# Patient Record
(demographics unavailable — no encounter records)

---

## 2017-04-18 NOTE — ER DOCUMENT REPORT
ED GI/





- General


Chief Complaint: Urinary Incontinence


Stated Complaint: DISORIENTED,NO CONTROL OF URINE


Mode of Arrival: Wheelchair


Information source: Patient


TRAVEL OUTSIDE OF THE [place] IN LAST 30 DAYS: No





- HPI


Patient complains to provider of: Other - CONFUSED, URINARY URGENCY W/ 

INCONTINENCE


Onset: Yesterday


Timing/Duration: Gradual, Waxing and waning


Quality of pain: No pain


Severity at maximum: Moderate


Severity in ED: Mild


Context: denies: Bad food, Lifting, Out of the country travel, Pregnant, Recent 

trauma


Vaginal bleeding (Compared to normal period): None


Menstrual period history: S/P menopausal


Associated symptoms: Urinary urgency.  denies: Chills, Fever, Hematuria, Nausea

, Sweaty


Exacerbated by: Denies


Relieved by: Denies


Similar symptoms previously: Yes - W/ UTI


Recently seen / treated by doctor: Yes - Rx FOR BRONCHITIS (AMOXIL)





- Related Data


Allergies/Adverse Reactions: 


 





doxepin Allergy (Verified 17 12:49)


 








Home Medications: 


 Current Home Medications





Buspirone HCl [Buspar 10 mg Tablet] 10 mg PO BID 17 [History]


Hydromorphone HCl 2 mg PO BID 17 [History]


Temazepam 30 mg PO DAILY 17 [History]


Temazepam [Restoril 15 mg Capsule] 30 mg PO QHS 17 [History]











Past Medical History





- General


Information source: Patient, Relative





- Social History


Smoking Status: Never Smoker


Cigarette use (# per day): No


Chew tobacco use (# tins/day): No


Smoking Education Provided: No


Frequency of alcohol use: None


Drug Abuse: None


Lives with: Family


Family History: Reviewed & Not Pertinent


Patient has suicidal ideation: No


Patient has homicidal ideation: No





- Past Medical History


Cardiac Medical History: Reports: Hx Hypercholesterolemia - no meds x 2 years, 

after 60 pound wt loss, Hx Hypertension - no meds x 2 years, after 60 pound wt 

loss


   Denies: Hx Atrial Fibrillation, Hx Congestive Heart Failure, Hx Coronary 

Artery Disease, Hx Heart Attack, Hx Peripheral Vascular Disease, Hx Pulmonary 

Embolism, Hx Heart Murmur


Pulmonary Medical History: Reports: Hx Pneumonia - denies hospitalization, Hx 

Sleep Apnea - CPAP ill fitting, does not wear


   Denies: Hx Asthma, Hx Bronchitis, Hx COPD, Hx Respiratory Failure, Hx 

Tuberculosis


Neurological Medical History: Reports: Hx Cerebrovascular Accident - , 

, Hx Seizures - possibly r/t severe "migraine type" HA's


Endocrine Medical History: Reports: Hx Diabetes Mellitus Type 2, Hx 

Hypothyroidism - meds Rx'ed .  Denies: Hx Graves' Disease, Hx 

Hyperthyroidism


Renal/ Medical History: Denies: Hx End Stage Renal Disease, Hx Kidney Stones, 

Hx Ovarian Cysts, Hx Peritoneal Dialysis, Hx Pelvic Inflammatory Disease


Malignancy Medical History: Denies: Hx Breast Cancer, Hx Cervical Cancer, Hx 

Leukemia, Hx Lung Cancer, Hx Ovarian Cancer


GI Medical History: Reports: Hx Gastroesophageal Reflux Disease - meds x 20+ 

years, Hx Ulcer - .  Denies: Hx Crohn's Disease, Hx Hepatitis, Hx Hiatal 

Hernia, Hx Irritable Bowel, Hx Liver Failure


Musculoskeltal Medical History: Reports Hx Arthritis, Denies Hx Fibromyalgia, 

Denies Hx Multiple Sclerosis, Denies Hx Muscular Dystrophy


Psychiatric Medical History: Reports: Hx Dementia, Hx Depression


   Denies: Hx Bipolar Disorder, Hx Post Traumatic Stress Disorder, Hx 

Schizophrenia


Traumatic Medical History: Reports: Hx Fractures - 5th toe RT foot


Infectious Medical History: Denies: Hx Hepatitis, Hx HIV


Past Surgical History: Reports: Hx Appendectomy - incidental with POP, Hx 

Cholecystectomy - open chang , Hx Hysterectomy - POP BSO .  Denies: 

Hx Bowel Surgery, Hx  Section, Hx Colostomy, Hx Coronary Artery Bypass 

Graft, Hx Gastric Bypass Surgery, Hx Herniorrhaphy, Hx Mastectomy, Hx Open 

Heart Surgery, Hx Pacemaker, Hx Tonsillectomy, Hx Tubal Ligation





- Immunizations


Immunizations up to date: Yes


Hx Diphtheria, Pertussis, Tetanus Vaccination: Yes


Hx Pneumococcal Vaccination: 10/01/11





Review of Systems





- Review of Systems


Constitutional: No symptoms reported.  denies: Chills, Diaphoresis, Fever


EENT: No symptoms reported


Cardiovascular: No symptoms reported


Respiratory: Cough


Gastrointestinal: No symptoms reported.  denies: Diarrhea, Nausea, Vomiting


Genitourinary: See HPI


Female Genitourinary: Post menopausal


Musculoskeletal: No symptoms reported


Skin: No symptoms reported


Neurological/Psychological: No symptoms reported





Physical Exam





- Vital signs


Vitals: 


 











Temp Pulse Resp BP Pulse Ox


 


 97.4 F   77   18   184/104 H  94 


 


 17 12:33  17 12:33  17 12:33  17 12:33  17 12:33











Interpretation: Hypertensive.  No: Tachycardic, Hypoxic, Tachypneic, Febrile





- General


General appearance: Appears well, Alert


In distress: None





- HEENT


Head: Normocephalic


Eyes: Normal


Conjunctiva: Normal


Ears: Normal


Nasal: Normal


Mucous membranes: Dry, Other - WHITISH PLAQUES ON PALATE, ? JOSE





- Respiratory


Respiratory status: No respiratory distress


Chest status: Nontender


Breath sounds: Nonproductive cough - FREQUENT





- Cardiovascular


Rhythm: Regular


Heart sounds: Normal auscultation


Murmur: No





- Abdominal


Inspection: Normal


Distension: No distension


Bowel sounds: Normal


Tenderness: Tender - SUPRAPUBIC





- Back


Back: Normal





- Extremities


General upper extremity: Normal inspection


General lower extremity: Normal inspection.  No: Tender, Edema





- Neurological


Neuro grossly intact: Yes


Cognition: Normal


Orientation: AAOx4





- Psychological


Associated symptoms: Normal affect, Normal mood





- Skin


Skin Temperature: Warm


Skin Moisture: Dry


Skin Color: Normal


Skin Turgor: Elastic





Course





- Re-evaluation


Re-evalutation: 





17 19:13


Patient states she feels much better.  Denies thirst.  Blood sugar is coming 

down satisfactorily.  Hypertension persists and will need treatment.  Diagnoses 

and treatment plan discussed with patient and family.  She will be started on 

glyburide and a low dose of lisinopril and should be followed up by her PCP in 

3 days.





- Vital Signs


Vital signs: 


 











Temp Pulse Resp BP Pulse Ox


 


 97.4 F   77   18   168/90 H  94 


 


 17 12:33  17 12:33  17 18:01  17 18:01  17 18:01














- Laboratory


Result Diagrams: 


 17 13:00





 17 13:00


Laboratory results interpreted by me: 


 











  17





  13:00 13:00 13:05


 


WBC  19.4 H  


 


Hgb  16.2 H  


 


Hct  47.6 H  


 


Seg Neutrophils %  91.7 H  


 


Lymphocytes %  5.5 L  


 


Monocytes %  2.5 L  


 


Absolute Neutrophils  17.7 H  


 


Sodium   129.2 L 


 


Chloride   92 L 


 


Carbon Dioxide   21 L 


 


BUN   33 H 


 


Glucose   672 H* 


 


POC Glucose   


 


Alkaline Phosphatase   133 H 


 


Urine Glucose (UA)    >=500 H


 


Urine Blood    SMALL H














  17





  14:38 15:28 16:50


 


WBC   


 


Hgb   


 


Hct   


 


Seg Neutrophils %   


 


Lymphocytes %   


 


Monocytes %   


 


Absolute Neutrophils   


 


Sodium   


 


Chloride   


 


Carbon Dioxide   


 


BUN   


 


Glucose   


 


POC Glucose  476 H*  489 H*  340 H


 


Alkaline Phosphatase   


 


Urine Glucose (UA)   


 


Urine Blood   














  17





  17:46


 


WBC 


 


Hgb 


 


Hct 


 


Seg Neutrophils % 


 


Lymphocytes % 


 


Monocytes % 


 


Absolute Neutrophils 


 


Sodium 


 


Chloride 


 


Carbon Dioxide 


 


BUN 


 


Glucose 


 


POC Glucose  326 H


 


Alkaline Phosphatase 


 


Urine Glucose (UA) 


 


Urine Blood 














- Diagnostic Test


Radiology reviewed: Image reviewed, Reports reviewed





Discharge





- Discharge


Clinical Impression: 


 Dehydration, Hyperglycemia without ketosis, Essential hypertension





Condition: Stable


Disposition: HOME, SELF-CARE


Instructions:  Dehydration (OM), Diabetes (OM), High Blood Pressure, 

Requiring Treatment (Formerly [facility])


Additional Instructions: 


REST, DRINK PLENTY OF FLUIDS.


AVOID SUGAR IN YOUR DIET.


TAKE YOUR MEDS AS PRESCRIBED. 


FOLLOW UP WITH YOUR PRIMARY CARE PROVIDER WITHIN THE NEXT 3 DAYS FOR RE-CHECK 

OF YOUR BLOOD PRESSURE AND BLOOD SUGAR.


RETURN TO E.R. IF YOU GET WORSE, ANY TIME.


Prescriptions: 


Glyburide 5 mg PO QAM #7 tablet


Lisinopril 5 mg PO DAILY #7 tablet


Referrals: 


STACY MCGILL MD [Primary Care Provider] - Follow up in 3-5 days

## 2017-04-18 NOTE — ER DOCUMENT REPORT
ED Medical Screen (RME)





- General


Chief Complaint: Urinary Incontinence


Stated Complaint: DISORIENTED,NO CONTROL OF URINE


Time seen by provider: 12:49


Mode of Arrival: Wheelchair


Information source: Patient, Relative


TRAVEL OUTSIDE OF THE [place] IN LAST 30 DAYS: No





- HPI


Patient complains to provider of: urinary frequency, dysuria, forgetful


Onset: Other - 4 days


Onset/Duration: Gradual, Persistent


Quality of pain: Pressure


Severity: Moderate


Associated Symptoms: Dysuria.  denies: Diarrhea, Fever, Nausea, Vomiting


Exacerbated by: Denies


Relieved by: Denies


Similar symptoms previously: Yes


Recently seen / treated by doctor: Yes - Amoxicillin for bronchitis





- Related Data


Allergies/Adverse Reactions: 


 





No Known Allergies Allergy (Verified 17 12:33)


 











Past Medical History





- Past Medical History


Cardiac Medical History: Reports: Hx Hypercholesterolemia - no meds x 2 years, 

after 60 pound wt loss, Hx Hypertension - no meds x 2 years, after 60 pound wt 

loss


   Denies: Hx Atrial Fibrillation, Hx Congestive Heart Failure, Hx Coronary 

Artery Disease, Hx Heart Attack, Hx Peripheral Vascular Disease, Hx Pulmonary 

Embolism, Hx Heart Murmur


Pulmonary Medical History: Reports: Hx Pneumonia - denies hospitalization, Hx 

Sleep Apnea - CPAP ill fitting, does not wear


   Denies: Hx Asthma, Hx Bronchitis, Hx COPD, Hx Respiratory Failure, Hx 

Tuberculosis


Neurological Medical History: Reports: Hx Cerebrovascular Accident - 2012

, Hx Seizures - possibly r/t severe "migraine type" HA's


Endocrine Medical History: Reports: Hx Diabetes Mellitus Type 2, Hx 

Hypothyroidism - meds Rx'ed .  Denies: Hx Graves' Disease, Hx 

Hyperthyroidism


Renal/ Medical History: Denies: Hx End Stage Renal Disease, Hx Kidney Stones, 

Hx Ovarian Cysts, Hx Peritoneal Dialysis, Hx Pelvic Inflammatory Disease


Malignancy Medical History: Denies: Hx Breast Cancer, Hx Cervical Cancer, Hx 

Leukemia, Hx Lung Cancer, Hx Ovarian Cancer


GI Medical History: Reports: Hx Gastroesophageal Reflux Disease - meds x 20+ 

years, Hx Ulcer - .  Denies: Hx Crohn's Disease, Hx Hepatitis, Hx Hiatal 

Hernia, Hx Irritable Bowel, Hx Liver Failure


Musculoskeltal Medical History: Reports Hx Arthritis, Denies Hx Fibromyalgia, 

Denies Hx Multiple Sclerosis, Denies Hx Muscular Dystrophy


Psychiatric Medical History: Reports: Hx Dementia, Hx Depression


   Denies: Hx Bipolar Disorder, Hx Post Traumatic Stress Disorder, Hx 

Schizophrenia


Traumatic Medical History: Reports: Hx Fractures - 5th toe RT foot


Infectious Medical History: Denies: Hx Hepatitis, Hx HIV


Past Surgical History: Reports: Hx Appendectomy - incidental with POP, Hx 

Cholecystectomy - open chang , Hx Hysterectomy - POP BSO .  Denies: 

Hx Bowel Surgery, Hx  Section, Hx Colostomy, Hx Coronary Artery Bypass 

Graft, Hx Gastric Bypass Surgery, Hx Herniorrhaphy, Hx Mastectomy, Hx Open 

Heart Surgery, Hx Pacemaker, Hx Tonsillectomy, Hx Tubal Ligation





- Immunizations


Immunizations up to date: Yes


Hx Diphtheria, Pertussis, Tetanus Vaccination: Yes





Physical Exam





- Vital signs


Vitals: 





 











Temp Pulse Resp BP Pulse Ox


 


 97.4 F   77   18   184/104 H  94 


 


 17 12:33  17 12:33  17 12:33  17 12:33  17 12:33














Course





- Vital Signs


Vital signs: 





 











Temp Pulse Resp BP Pulse Ox


 


 97.4 F   77   18   184/104 H  94 


 


 17 12:33  17 12:33  17 12:33  17 12:33  17 12:33

## 2018-04-09 NOTE — ER DOCUMENT REPORT
ED General





- General


Chief Complaint: General Weakness


Stated Complaint: weakness


Time Seen by Provider: 18 17:01


Notes: 





Patient is a 78-year-old female with a past medical history of TIA and stroke 

chronically anticoagulated on Aggrenox as a result of this prior history, 

hypertension, hyperlipidemia, and dementia who presents with 3 days of nausea, 

vomiting, dark stool, and generalized fatigue.  The patient has no prior 

history of GI bleeds.  Her symptoms have been worsening since onset.  Nothing 

improves or worsens her symptoms.  She has not seen a primary care doctor 

regarding today's concerns.  She does note a dull, constant, aching upper 

abdominal pain.  She states that this pain is overall been unchanged since 

onset.  She denies any fever or constitutional symptoms.  Family at the bedside 

supports her history.


TRAVEL OUTSIDE OF THE [place] IN LAST 30 DAYS: No





- Related Data


Allergies/Adverse Reactions: 


 





doxepin Allergy (Verified 18 17:46)


 











Past Medical History





- General


Information source: Patient, Relative





- Social History


Smoking Status: Never Smoker


Chew tobacco use (# tins/day): No


Frequency of alcohol use: None


Drug Abuse: None


Lives with: Family


Family History: Reviewed & Not Pertinent


Patient has suicidal ideation: No


Patient has homicidal ideation: No





- Past Medical History


Cardiac Medical History: Reports: Hx Hypercholesterolemia - no meds x 2 years, 

after 60 pound wt loss, Hx Hypertension - no meds x 2 years, after 60 pound wt 

loss


   Denies: Hx Atrial Fibrillation, Hx Congestive Heart Failure, Hx Coronary 

Artery Disease, Hx Heart Attack, Hx Peripheral Vascular Disease, Hx Pulmonary 

Embolism, Hx Heart Murmur


Pulmonary Medical History: Reports: Hx Pneumonia - denies hospitalization, Hx 

Sleep Apnea - CPAP ill fitting, does not wear


   Denies: Hx Asthma, Hx Bronchitis, Hx COPD, Hx Respiratory Failure, Hx 

Tuberculosis


Neurological Medical History: Reports: Hx Cerebrovascular Accident - , 

, Hx Seizures - possibly r/t severe "migraine type" HA's


Endocrine Medical History: Reports: Hx Diabetes Mellitus Type 2, Hx 

Hypothyroidism - meds Rx'ed .  Denies: Hx Graves' Disease, Hx 

Hyperthyroidism


Renal/ Medical History: Denies: Hx End Stage Renal Disease, Hx Kidney Stones, 

Hx Ovarian Cysts, Hx Peritoneal Dialysis, Hx Pelvic Inflammatory Disease


Malignancy Medical History: Denies: Hx Breast Cancer, Hx Cervical Cancer, Hx 

Leukemia, Hx Lung Cancer, Hx Ovarian Cancer


GI Medical History: Reports: Hx Gastroesophageal Reflux Disease - meds x 20+ 

years, Hx Ulcer - .  Denies: Hx Crohn's Disease, Hx Hepatitis, Hx Hiatal 

Hernia, Hx Irritable Bowel, Hx Liver Failure, Hx Pancreatitis


Musculoskeltal Medical History: Reports Hx Arthritis, Denies Hx Fibromyalgia, 

Denies Hx Multiple Sclerosis, Denies Hx Muscular Dystrophy


Psychiatric Medical History: Reports: Hx Dementia, Hx Depression


   Denies: Hx Bipolar Disorder, Hx Post Traumatic Stress Disorder, Hx 

Schizophrenia


Traumatic Medical History: Reports: Hx Fractures - 5th toe RT foot


Infectious Medical History: Denies: Hx Hepatitis, Hx HIV


Past Surgical History: Reports: Hx Appendectomy - incidental with POP, Hx 

Cholecystectomy - open chang , Hx Hysterectomy - POP BSO .  Denies: 

Hx Bowel Surgery, Hx  Section, Hx Colostomy, Hx Coronary Artery Bypass 

Graft, Hx Gastric Bypass Surgery, Hx Herniorrhaphy, Hx Mastectomy, Hx Open 

Heart Surgery, Hx Pacemaker, Hx Tonsillectomy, Hx Tubal Ligation





- Immunizations


Immunizations up to date: Yes


Hx Diphtheria, Pertussis, Tetanus Vaccination: Yes


Hx Pneumococcal Vaccination: 10/01/11





Review of Systems





- Review of Systems


Notes: 





Constitutional: Negative for fever.


HENT: Negative for sore throat.


Eyes: Negative for visual changes.


Cardiovascular: Negative for chest pain.


Respiratory: Negative for shortness of breath.


Gastrointestinal: Positive for abdominal pain, vomiting and melanotic stool


Genitourinary: Negative for dysuria.


Musculoskeletal: Negative for back pain.


Skin: Negative for rash.


Neurological: Negative for headaches, weakness or numbness.





10 point ROS negative except as marked above and in HPI.





Physical Exam





- Vital signs


Vitals: 


 











Temp Pulse Resp BP Pulse Ox


 


 97.9 F   60   16   128/68 H  97 


 


 18 16:47  18 16:47  18 16:47  18 16:47  18 16:47











Interpretation: Normal


Notes: 





PHYSICAL EXAMINATION:





GENERAL: Well-appearing, well-nourished and in no acute distress.





HEAD: Atraumatic, normocephalic.





EYES: Pupils equal round and reactive to light, extraocular movements intact, 

sclera anicteric, conjunctiva are normal.





ENT: nares patent, oropharynx clear without exudates.  Moist mucous membranes.





NECK: Normal range of motion, supple without lymphadenopathy





LUNGS: Breath sounds clear to auscultation bilaterally and equal.  No wheezes 

rales or rhonchi.





HEART: Regular rate and rhythm without murmurs





ABDOMEN: Soft, mild epigastric abdominal tenderness to palpation but no other 

localized areas of tenderness, normoactive bowel sounds.  No guarding, no 

rebound.  No masses appreciated.





Rectal exam: Melanotic stool





EXTREMITIES: Normal range of motion, no pitting or edema.  No cyanosis.





NEUROLOGICAL: No focal neurological deficits. Moves all extremities 

spontaneously and on command.





PSYCH: Normal mood, normal affect.





SKIN: Warm, Dry, normal turgor, no rashes or lesions noted.





Course





- Re-evaluation


Re-evalutation: 





18 19:38


Patient presents with 3 days of melanotic stools with associated generalized 

weakness, nausea and vomiting without hematemesis.  The patient has some mild 

epigastric and left upper quadrant abdominal tenderness on palpation but no 

areas of rebound or guarding.  She is anticoagulated on Aggrenox.  Her vitals 

are within acceptable limits.  Bedside examination does show melena on rectal 

examination.  We do not currently have GI coverage and given that patient is 

anticoagulated, is actively having melanotic stools with associated symptoms 

suggesting upper GI bleed she will require transfer to [facility] for GI 

evaluation.  She has been started on Protonix infusion after a bolus of 

Protonix.  She has been made n.p.o.  Antiplatelet is being held.  Will continue 

to monitor until patient is able to be transferred.


18 19:53


I discussed this case with Dr. Joseph Trivedi who has accepted the patient for 

transfer.  She remains hemodynamically within acceptable limits.  Waiting 

transport


18 22:33


Patient has remained hemodynamically within normal limits.  Transport has 

arrived for transfer and patient is stable for transport at this time.





- Vital Signs


Vital signs: 


 











Temp Pulse Resp BP Pulse Ox


 


 98.1 F   67   13   129/67 H  96 


 


 18 22:34  18 17:45  18 22:34  18 22:34  18 22:34














- Laboratory


Result Diagrams: 


 18 17:36





 18 17:36


Laboratory results interpreted by me: 


 











  18





  17:36 17:36 17:36


 


RBC  3.60 L  


 


Hct  35.8 L  


 


MCV  100 H  


 


MCH  35.1 H  


 


Plt Count  114 L  


 


APTT   38.4 H 


 


BUN    22 H


 


Glucose    130 H


 


AST    195 H


 


ALT    239 H














Discharge





- Discharge


Clinical Impression: 


 Upper GI bleed, Melena, Blood loss anemia, Upper abdominal pain





Nausea and vomiting


Qualifiers:


 Vomiting type: unspecified Vomiting Intractability: non-intractable Qualified 

Code(s): R11.2 - Nausea with vomiting, unspecified





Condition: Fair


Disposition: [facility]


Referrals: 


STACY MCGILL MD [Primary Care Provider] - Follow up as needed

## 2018-04-09 NOTE — ER DOCUMENT REPORT
ED Medical Screen (RME)





- General


Chief Complaint: Weakness


Stated Complaint: weakness


Time Seen by Provider: 18 17:01


Notes: 





Patient is a 78-year-old female presents emergency department via EMS with a 

chief complaint of generalized weakness, abdominal discomfort, vomiting and 

dark tarry stools for 1 week.  She is on Aggrenox for previous TIAs and CVAs.  

Past medical history significant for vascular dementia, hypothyroidism, 

depression anxiety.  At home she takes Aggrenox, denies pill, hydromorphone, 

Synthroid, Nexium, Restasis, temazepam, Wellbutrin, BuSpar, lisinopril.


TRAVEL OUTSIDE OF THE [place] IN LAST 30 DAYS: No





- Related Data


Allergies/Adverse Reactions: 


 





doxepin Allergy (Verified 17 12:49)


 











Past Medical History





- Past Medical History


Cardiac Medical History: Reports: Hx Hypercholesterolemia - no meds x 2 years, 

after 60 pound wt loss, Hx Hypertension - no meds x 2 years, after 60 pound wt 

loss


   Denies: Hx Atrial Fibrillation, Hx Congestive Heart Failure, Hx Coronary 

Artery Disease, Hx Heart Attack, Hx Peripheral Vascular Disease, Hx Pulmonary 

Embolism, Hx Heart Murmur


Pulmonary Medical History: Reports: Hx Pneumonia - denies hospitalization, Hx 

Sleep Apnea - CPAP ill fitting, does not wear


   Denies: Hx Asthma, Hx Bronchitis, Hx COPD, Hx Respiratory Failure, Hx 

Tuberculosis


Neurological Medical History: Reports: Hx Cerebrovascular Accident - , 

, Hx Seizures - possibly r/t severe "migraine type" HA's


Endocrine Medical History: Reports: Hx Diabetes Mellitus Type 2, Hx 

Hypothyroidism - meds Rx'ed .  Denies: Hx Graves' Disease, Hx 

Hyperthyroidism


Renal/ Medical History: Denies: Hx End Stage Renal Disease, Hx Kidney Stones, 

Hx Ovarian Cysts, Hx Peritoneal Dialysis, Hx Pelvic Inflammatory Disease


Malignancy Medical History: Denies: Hx Breast Cancer, Hx Cervical Cancer, Hx 

Leukemia, Hx Lung Cancer, Hx Ovarian Cancer


GI Medical History: Reports: Hx Gastroesophageal Reflux Disease - meds x 20+ 

years, Hx Ulcer - .  Denies: Hx Crohn's Disease, Hx Hepatitis, Hx Hiatal 

Hernia, Hx Irritable Bowel, Hx Liver Failure, Hx Pancreatitis


Musculoskeltal Medical History: Reports Hx Arthritis, Denies Hx Fibromyalgia, 

Denies Hx Multiple Sclerosis, Denies Hx Muscular Dystrophy


Psychiatric Medical History: Reports: Hx Dementia, Hx Depression


   Denies: Hx Bipolar Disorder, Hx Post Traumatic Stress Disorder, Hx 

Schizophrenia


Traumatic Medical History: Reports: Hx Fractures - 5th toe RT foot


Infectious Medical History: Denies: Hx Hepatitis, Hx HIV


Past Surgical History: Reports: Hx Appendectomy - incidental with POP, Hx 

Cholecystectomy - open chang , Hx Hysterectomy - POP BSO 's.  Denies: 

Hx Bowel Surgery, Hx  Section, Hx Colostomy, Hx Coronary Artery Bypass 

Graft, Hx Gastric Bypass Surgery, Hx Herniorrhaphy, Hx Mastectomy, Hx Open 

Heart Surgery, Hx Pacemaker, Hx Tonsillectomy, Hx Tubal Ligation





- Immunizations


Immunizations up to date: Yes


Hx Diphtheria, Pertussis, Tetanus Vaccination: Yes





Physical Exam





- Vital signs


Vitals: 





 











Temp Pulse Resp BP Pulse Ox


 


 97.9 F   60   16   128/68 H  97 


 


 18 16:47  18 16:47  18 16:47  18 16:47  18 16:47














- Notes


Notes: 





PHYSICAL EXAM


GENERAL: Alert, interacts well. Pale


HEAD: Normocephalic, atraumatic.


EYES: Pupils equal, round, and reactive to light. Extraocular movements intact.


ENT: Oral mucosa moist, tongue midline. 


NECK: Full range of motion. Supple. Trachea midline.


LUNGS: Clear to auscultation bilaterally, no wheezes, rales, or rhonchi. No 

respiratory distress.


HEART: Regular rate and rhythm. No murmurs, gallops, or rubs.


ABDOMEN: Soft,  nondistended, nontender. No guarding, rebound, or rigidity.. 

Bowel sounds present in all 4 quadrants.


EXTREMITIES: Moves all 4 extremities spontaneously. No edema, radial and 

dorsalis pedis pulses 2/4 bilaterally. No cyanosis. 


NEUROLOGICAL: Alert and oriented x4. Normal speech.


PSYCH: Normal affect, normal mood.


SKIN: Warm, dry, normal turgor. No rashes or lesions noted.








Course





- Vital Signs


Vital signs: 





 











Temp Pulse Resp BP Pulse Ox


 


 97.9 F   60   16   128/68 H  97 


 


 18 16:47  18 16:47  18 16:47  18 16:47  18 16:47

## 2018-09-10 NOTE — RADIOLOGY REPORT (SQ)
EXAM DESCRIPTION:  CT HEAD WITHOUT



COMPLETED DATE/TIME:  9/10/2018 4:53 pm



REASON FOR STUDY:  ams



COMPARISON:  MRI brain 2/3/2016

9 prior CT brain exams since 6/24/2010, most recently 3/19/2016



TECHNIQUE:  Axial images acquired through the brain without intravenous contrast.  Images reviewed wi
th bone, brain and subdural windows.  Additional sagittal and coronal reconstructions were generated.
 Images stored on PACS.

All CT scanners at this facility use dose modulation, iterative reconstruction, and/or weight based d
osing when appropriate to reduce radiation dose to as low as reasonably achievable (ALARA).

[facility]: Dose Right  [facility]: CareDose    [facility]: Dose Right    [facility]: Teradose 4D    [facility]: Smart Technologies



RADIATION DOSE:  CT Rad equipment meets quality standard of care and radiation dose reduction techniq
ues were employed. CTDIvol: 53.2 mGy. DLP: 964 mGy-cm. mGy.



LIMITATIONS:  None.



FINDINGS:  VENTRICLES: Normal size and contour.

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  Old lacunar infarcts in the bilateral basal ganglia and b
ifrontal deep periventricular white matter. No CT evidence of acute large territory ischemic change, 
acute intracranial hemorrhage, mass effect, or midline shift.

CEREBELLUM: No masses.  No hemorrhage.  No alteration of density.  No evidence for acute infarction.

EXTRAAXIAL SPACES: No fluid collections.  No masses.

ORBITS AND GLOBE: No intra- or extraconal masses.  Normal contour of globe without masses.

CALVARIUM: No fracture.

PARANASAL SINUSES: No fluid or mucosal thickening.

SOFT TISSUES: No mass or hematoma.

OTHER: No other significant finding.



IMPRESSION:  No acute findings.

Old left occipital infarct.  Old lacunar infarcts in the bilateral basal ganglia and bifrontal deep p
eriventricular white matter

EVIDENCE OF ACUTE STROKE: NO.



COMMENT:  Quality ID # 436: Final reports with documentation of one or more dose reduction techniques
 (e.g., Automated exposure control, adjustment of the mA and/or kV according to patient size, use of 
iterative reconstruction technique)



TECHNICAL DOCUMENTATION:  JOB ID:  [id number]

 2011 [employer]- All Rights Reserved



Reading location - IP/workstation name: [email or web address]

## 2018-09-10 NOTE — ER DOCUMENT REPORT
ED General





- General


Chief Complaint: Altered Mental Status


Stated Complaint: ALTERED MENTAL STATUS


Time Seen by Provider: 09/10/18 15:19


TRAVEL OUTSIDE OF THE [place] IN LAST 30 DAYS: No





- HPI


Patient complains to provider of: Feeling unwell


Notes: 





Patient coming in for evaluation of feeling unwell states not feeling herself 

for the last 2 3 days.  Generalized weakness.  Patient denies any fevers chills 

nausea vomiting diarrhea.  Family bedside states noted patient on exam that she 

has a urinary tract infection.  Patient is chronic pain patient currently on 

Butrans also takes oral pain medication states she is not taking any extra 

medications her last few days states that she has had a good appetite staying 

hydrated and eating plenty of food.  Patient denies any chest pain abdominal 

pain hip pain denies any recent falls or denies any recent trauma.





- Related Data


Allergies/Adverse Reactions: 


 





doxepin Allergy (Verified 18 17:46)


 











Past Medical History





- Social History


Smoking Status: Unknown if Ever Smoked


Family History: Reviewed & Not Pertinent


Patient has suicidal ideation: No


Patient has homicidal ideation: No





- Past Medical History


Cardiac Medical History: Reports: Hx Hypercholesterolemia - no meds x 2 years, 

after 60 pound wt loss, Hx Hypertension - no meds x 2 years, after 60 pound wt 

loss


   Denies: Hx Atrial Fibrillation, Hx Congestive Heart Failure, Hx Coronary 

Artery Disease, Hx Heart Attack, Hx Peripheral Vascular Disease, Hx Pulmonary 

Embolism, Hx Heart Murmur


Pulmonary Medical History: Reports: Hx Pneumonia - denies hospitalization, Hx 

Sleep Apnea - CPAP ill fitting, does not wear


   Denies: Hx Asthma, Hx Bronchitis, Hx COPD, Hx Respiratory Failure, Hx 

Tuberculosis


Neurological Medical History: Reports: Hx Cerebrovascular Accident - 2012

, Hx Seizures - possibly r/t severe "migraine type" HA's


Endocrine Medical History: Reports: Hx Diabetes Mellitus Type 2, Hx 

Hypothyroidism - meds Rx'ed .  Denies: Hx Graves' Disease, Hx 

Hyperthyroidism


Renal/ Medical History: Denies: Hx End Stage Renal Disease, Hx Kidney Stones, 

Hx Ovarian Cysts, Hx Peritoneal Dialysis, Hx Pelvic Inflammatory Disease


Malignancy Medical History: Denies: Hx Breast Cancer, Hx Cervical Cancer, Hx 

Leukemia, Hx Lung Cancer, Hx Ovarian Cancer


GI Medical History: Reports: Hx Gastroesophageal Reflux Disease - meds x 20+ 

years, Hx Ulcer - .  Denies: Hx Crohn's Disease, Hx Hepatitis, Hx Hiatal 

Hernia, Hx Irritable Bowel, Hx Liver Failure, Hx Pancreatitis


Musculoskeletal Medical History: Reports Hx Arthritis, Denies Hx Fibromyalgia, 

Denies Hx Multiple Sclerosis, Denies Hx Muscular Dystrophy


Psychiatric Medical History: Reports: Hx Dementia, Hx Depression


   Denies: Hx Bipolar Disorder, Hx Post Traumatic Stress Disorder, Hx 

Schizophrenia


Traumatic Medical History: Reports: Hx Fractures - 5th toe RT foot


Infectious Medical History: Denies: Hx Hepatitis, Hx HIV


Past Surgical History: Reports: Hx Appendectomy - incidental with POP, Hx 

Cholecystectomy - open chang , Hx Hysterectomy.  Denies: Hx Bowel Surgery

, Hx  Section, Hx Colostomy, Hx Coronary Artery Bypass Graft, Hx 

Gastric Bypass Surgery, Hx Herniorrhaphy, Hx Mastectomy, Hx Open Heart Surgery, 

Hx Pacemaker, Hx Tonsillectomy, Hx Tubal Ligation





- Immunizations


Immunizations up to date: Yes


Hx Diphtheria, Pertussis, Tetanus Vaccination: Yes


Hx Pneumococcal Vaccination: 10/01/11





Review of Systems





- Review of Systems


Constitutional: No symptoms reported


EENT: No symptoms reported


Cardiovascular: No symptoms reported


Respiratory: No symptoms reported


Gastrointestinal: No symptoms reported


Genitourinary: No symptoms reported


Female Genitourinary: No symptoms reported


Musculoskeletal: No symptoms reported


Skin: No symptoms reported


Hematologic/Lymphatic: No symptoms reported


Neurological/Psychological: Weakness


-: Yes All other systems reviewed and negative





Physical Exam





- Vital signs


Vitals: 


 











Temp


 


 97.6 F 


 


 09/10/18 15:04











Interpretation: Hypotensive





- General


General appearance: Appears well, Alert





- HEENT


Head: Normocephalic, Atraumatic


Eyes: Normal


Pupils: PERRL





- Respiratory


Respiratory status: No respiratory distress


Chest status: Nontender


Breath sounds: Normal


Chest palpation: Normal





- Cardiovascular


Rhythm: Regular


Heart sounds: Normal auscultation


Murmur: No





- Abdominal


Inspection: Normal


Distension: No distension


Bowel sounds: Normal


Tenderness: Nontender


Organomegaly: No organomegaly





- Back


Back: Normal, Nontender





- Extremities


General upper extremity: Normal inspection, Nontender, Normal color, Normal ROM

, Normal temperature


General lower extremity: Normal inspection, Nontender, Normal color, Normal ROM

, Normal temperature, Normal weight bearing.  No: Brian's sign





- Neurological


Neuro grossly intact: Yes


Cognition: Normal


Orientation: AAOx4


[place] Coma Scale Eye Opening: Spontaneous


[place] Coma Scale Verbal: Oriented


[place] Coma Scale Motor: Obeys Commands


Love Coma Scale Total: 15


Speech: Normal


Motor strength normal: LUE, RUE, LLE, RLE


Sensory: Normal





- Psychological


Associated symptoms: Normal affect, Normal mood





- Skin


Skin Temperature: Warm


Skin Moisture: Dry


Skin Color: Normal





Course





- Re-evaluation


Re-evalutation: 





09/10/18 21:37


Patient coming in for evaluation of generalized weakness.  Blood pressure did 

improve that there fluid bolus here in ER.  Patient states she has not taken 

any of her extra blood pressure medication or her extra pain medication.  At 

this time no signs of sepsis no signs of any other critical pathology seen.  

Patient able ambulate per her norm family states that she is handling better 

after IV fluids.  Possible etiology of polypharmacy recommended the patient 

take her blood pressure at home if the systolic blood pressure is not above 120 

to hold off on taking her blood pressure medication also recommend following up 

with her primary care physician for further medication reconciliation.  Patient 

will be discharged home





- Vital Signs


Vital signs: 


 











Temp Pulse Resp BP Pulse Ox


 


 98.6 F   79   19   110/49 L  100 


 


 09/10/18 15:07  09/10/18 15:25  09/10/18 18:23  09/10/18 18:23  09/10/18 18:23














- Laboratory


Result Diagrams: 


 09/10/18 15:12





 09/10/18 15:12


Laboratory results interpreted by me: 


 











  09/10/18 09/10/18 09/10/18





  15:12 15:12 15:25


 


RDW  14.9 H  


 


BUN   24 H 


 


Est GFR ( Amer)   55 L 


 


Est GFR (Non-Af Amer)   45 L 


 


Urine Glucose (UA)    50 H














Discharge





- Discharge


Clinical Impression: 


 Feeling unwell, No problem, feared complaint unfounded, Transient hypotension





Disposition: HOME, SELF-CARE


Additional Instructions: 


Your laboratory studies today EKG CT scan of her head chest x-ray urinalysis 

did not show any signs of infection at this time.  Your blood pressure was low 

upon arrival here to the ER.  I would hold off on taking any further blood 

pressure medications I would make sure that you are drinking and eating healthy 

diet.  I would discuss your pain management regimen with your primary care 

physician and your pain management doctors as at this along with her blood 

pressure medications may be the etiology of why you are not been feeling well.  

Return to the ER if you develop a fever or for any other concerns.





Please hold her medication for your blood pressure tonight.  Prior to taking 

her blood pressure medication I would recommend taking your blood pressure with 

a blood pressure cuff.  If the top number is below 120 I would recommend not 

taking her blood pressure medication.





Please make sure you follow-up with your primary care physician and the other 

physicians for further medication management.


Referrals: 


STACY MCGILL MD [Primary Care Provider] - Follow up as needed

## 2018-09-10 NOTE — RADIOLOGY REPORT (SQ)
EXAM DESCRIPTION:  CHEST 2 VIEWS



COMPLETED DATE/TIME:  9/10/2018 4:50 pm



REASON FOR STUDY:  ams



COMPARISON:  2/4/2016



EXAM PARAMETERS:  NUMBER OF VIEWS: two views

TECHNIQUE: Digital Frontal and Lateral radiographic views of the chest acquired.

RADIATION DOSE: NA

LIMITATIONS: none



FINDINGS:  LUNGS AND PLEURA: No opacities, masses or pneumothorax. No pleural effusion.

MEDIASTINUM AND HILAR STRUCTURES: No masses or contour abnormalities.

HEART AND VASCULAR STRUCTURES: Heart normal size.  No evidence for failure.

BONES: No acute findings.

HARDWARE: None in the chest.

OTHER: No other significant finding.



IMPRESSION:  1. NO ACUTE RADIOGRAPHIC FINDING IN THE CHEST.



TECHNICAL DOCUMENTATION:  JOB ID:  [id number]

 2011 [employer]- All Rights Reserved



Reading location - IP/workstation name: [email or web address]

## 2018-09-11 NOTE — EKG REPORT
SEVERITY:- OTHERWISE NORMAL ECG -

SINUS RHYTHM

VENTRICULAR PREMATURE COMPLEX

:

Confirmed by: Pam Blackmon 11-Sep-2018 09:30:18

## 2018-10-16 NOTE — ER DOCUMENT REPORT
ED Fall





- General


Chief Complaint: Fall Injury


Stated Complaint: LEFT SHOULDER/HIP PAIN


Time Seen by Provider: 10/16/18 10:54


Notes: 





78-year-old female lost her balance 3 times this morning and fell.  The patient 

did hit her head but had no loss of conscious.  Denies neck pain but states she 

is always have a sore neck.  She complains of severe 10 out of 10 left hip 

pain.  She states it hurts to move it EMS brought her in in a pelvic binder.  

She does have a history of femur fracture on that side.  She denies chest pain 

denies shortness of breath denies abdominal pain.  Denies any dizziness before 

the fall states she has lost her balance on and off in the past.  She denies 

any recent illnesses no fever chills cough sore throat no night sweats gland 

swelling or hemoptysis.  Denies any hematuria or dysuria.


TRAVEL OUTSIDE OF THE [place] IN LAST 30 DAYS: No





- Related data


Allergies/Adverse Reactions: 


 





doxepin Allergy (Verified 18 17:46)


 











Past Medical History





- Social History


Smoking Status: Unknown if Ever Smoked


Family History: Reviewed & Not Pertinent





- Past Medical History


Cardiac Medical History: Reports: Hx Hypercholesterolemia - no meds x 2 years, 

after 60 pound wt loss, Hx Hypertension - no meds x 2 years, after 60 pound wt 

loss


   Denies: Hx Atrial Fibrillation, Hx Congestive Heart Failure, Hx Coronary 

Artery Disease, Hx Heart Attack, Hx Peripheral Vascular Disease, Hx Pulmonary 

Embolism, Hx Heart Murmur


Pulmonary Medical History: Reports: Hx Pneumonia - denies hospitalization, Hx 

Sleep Apnea - CPAP ill fitting, does not wear


   Denies: Hx Asthma, Hx Bronchitis, Hx COPD, Hx Respiratory Failure, Hx 

Tuberculosis


Neurological Medical History: Reports: Hx Cerebrovascular Accident - , 

, Hx Seizures - possibly r/t severe "migraine type" HA's


Endocrine Medical History: Reports: Hx Diabetes Mellitus Type 2, Hx 

Hypothyroidism - meds Rx'ed .  Denies: Hx Graves' Disease, Hx 

Hyperthyroidism


Renal/ Medical History: Denies: Hx End Stage Renal Disease, Hx Kidney Stones, 

Hx Ovarian Cysts, Hx Peritoneal Dialysis, Hx Pelvic Inflammatory Disease


Malignancy Medical History: Denies: Hx Breast Cancer, Hx Cervical Cancer, Hx 

Leukemia, Hx Lung Cancer, Hx Ovarian Cancer


GI Medical History: Reports: Hx Gastroesophageal Reflux Disease - meds x 20+ 

years, Hx Ulcer - .  Denies: Hx Crohn's Disease, Hx Hepatitis, Hx Hiatal 

Hernia, Hx Irritable Bowel, Hx Liver Failure, Hx Pancreatitis


Musculoskeletal Medical History: Reports Hx Arthritis, Denies Hx Fibromyalgia, 

Denies Hx Multiple Sclerosis, Denies Hx Muscular Dystrophy


Psychiatric Medical History: Reports: Hx Dementia, Hx Depression


   Denies: Hx Bipolar Disorder, Hx Post Traumatic Stress Disorder, Hx 

Schizophrenia


Traumatic Medical History: Reports: Hx Fractures - 5th toe RT foot


Infectious Medical History: Denies: Hx Hepatitis, Hx HIV


Past Surgical History: Reports: Hx Appendectomy - incidental with POP, Hx 

Cholecystectomy - open chang , Hx Hysterectomy.  Denies: Hx Bowel Surgery

, Hx  Section, Hx Colostomy, Hx Coronary Artery Bypass Graft, Hx 

Gastric Bypass Surgery, Hx Herniorrhaphy, Hx Mastectomy, Hx Open Heart Surgery, 

Hx Pacemaker, Hx Tonsillectomy, Hx Tubal Ligation





- Immunizations


Immunizations up to date: Yes


Hx Diphtheria, Pertussis, Tetanus Vaccination: Yes


Hx Pneumococcal Vaccination: 10/01/11





Review of Systems





- Review of Systems


Constitutional: denies: Chills, Fever


Cardiovascular: denies: Chest pain, Dyspnea


Respiratory: denies: Hemoptysis, Short of breath


Gastrointestinal: denies: Nausea, Vomiting, Blood streaked bowels, Black stools

, Rectal bleeding


Genitourinary: denies: Dysuria, Hematuria


Musculoskeletal: Joint pain


Skin: denies: Rash


Neurological/Psychological: Other - Head injury


-: Yes All other systems reviewed and negative





Physical Exam





- Vital signs


Vitals: 


 











Temp


 


 98.1 F 


 


 10/16/18 10:41














- Notes


Notes: 





GENERAL_APPEARANCE: well_nourished, alert, cooperative, peers uncomfortable


 VITALS: reviewed, see vital signs table.


 HEAD: Slight tenderness left occiput area no obvious large hematoma or open 

wound noted depressions in the skull noted


 EYES: PERRL, EOMI, conjunctiva_clear.


 NOSE: no_nasal_discharge.


 MOUTH: (-)decreased moisture.


 THROAT: no_tonsilar_inflammation, no_airway_obstruction. no_lymphadenopathy


 NECK: supple, no focal_neck_tenderness, states neck just feels stiff, (-)

thyromegaly.


 BACK: no_back_tenderness.


 CHEST_WALL: no_chest_tenderness.


 LUNGS: no_wheezing, no_rales, no_rhonchi, (-)accessory muscle use, good air 

exchange bilateral.


 HEART: normal_rate, normal_rhythm, normal_S1, normal_S2, (-)S3, (-)S4, no_

murmur, no_rub.


 ABDOMEN: normal_BS, soft, no_abd_tenderness, (-)guarding, (-)rebound, no_

organomegaly, no_abd_masses.


 EXTREMITIES:  good pulses in all_extremities, there is slight rotation and 

shortening noted to the left leg with pain at the greater trochanter, no_edema.


 SKIN: warm, dry, good_color, no_rash.


 MENTAL_STATUS: speech_clear, oriented_X_3, normal_affect, responds_

appropriately to questions.


 NEURO: Neg Motor or Sensory Deficits on exam, CN 2-12 intact, DTR 2+ symmetric 

x 4, No cerbellar signs

















Course





- Re-evaluation


Re-evalutation: 





10/16/18 11:11


Patient had several mechanical falls this morning she is always had an issue 

with balance.  Will get a CT of her head to assess for any traumatic injuries 

or any other kind of CNS lesion causing this.  Patient will have some 

generalized blood work done.  We will x-ray her hip to look for fractures.  

Coags preop labs if needed.  We will keep her n.p.o.





10/16/18 12:47


All imaging is negative the left shoulder there may be a slight AC separation 

the patient is not really specifically tender there.  She does have pain upon 

moving the shoulder around I spoke with that to her.  She does not want a sling 

she states she would likely make her falling worse.  We will discharge the 

patient home.














- Vital Signs


Vital signs: 


 











Temp Pulse Resp BP Pulse Ox


 


 98.1 F      21 H  110/63   96 


 


 10/16/18 10:41     10/16/18 11:01  10/16/18 11:01  10/16/18 11:01














- Laboratory


Result Diagrams: 


 10/16/18 12:00





 10/16/18 12:00





- Diagnostic Test


Radiology results interpreted by me: 





10/16/18 12:46





 





Shoulder X-Ray  10/16/18 00:00


IMPRESSION:  Question widening at the left acromioclavicular joint.


Bones osteopenic.  Left shoulder films otherwise unremarkable


 








Chest X-Ray  10/16/18 11:03


IMPRESSION:  NO ACUTE RADIOGRAPHIC FINDING IN THE CHEST.


 








Hip X-Ray  10/16/18 11:03


IMPRESSION:  No acute findings


 








Cervical Spine CT  10/16/18 11:04


IMPRESSION:  No acute fracture or malalignment


 








Head CT  10/16/18 11:04


IMPRESSION:  No acute findings.


EVIDENCE OF ACUTE STROKE: NO.


 














Discharge





- Discharge


Clinical Impression: 


Accident due to mechanical fall without injury


Qualifiers:


 Encounter type: initial encounter Qualified Code(s): W19.XXXA - Unspecified 

fall, initial encounter





Contusion of left hip


Qualifiers:


 Encounter type: initial encounter Qualified Code(s): S70.02XA - Contusion of 

left hip, initial encounter





Contusion of left shoulder


Qualifiers:


 Encounter type: initial encounter Qualified Code(s): S40.012A - Contusion of 

left shoulder, initial encounter





Head injury


Qualifiers:


 Encounter type: initial encounter Qualified Code(s): S09.90XA - Unspecified 

injury of head, initial encounter





Condition: Good


Disposition: HOME, SELF-CARE


Instructions:  Head Injury Precautions ([facility]), Shoulder Injury ([facility])


Referrals: 


STACY MCGILL MD [Primary Care Provider] - Follow up as needed

## 2018-10-22 NOTE — RADIOLOGY REPORT (SQ)
EXAM DESCRIPTION:  CT PELVIS WITHOUT



COMPLETED DATE/TIME:  10/22/2018 2:53 pm



REASON FOR STUDY:  left hip pain, fall



COMPARISON:  None.



TECHNIQUE:  CT scan of the pelvis performed without intravenous or oral contrast.  Images reviewed wi
th soft tissue and bone windows.  Reconstructed coronal and sagittal MPR images reviewed.  All images
 stored on PACS.

All CT scanners at this facility use dose modulation, iterative reconstruction, and/or weight based d
osing when appropriate to reduce radiation dose to as low as reasonably achievable (ALARA).

[facility]: Dose Right  [facility]: CareDose    [facility]: Dose Right    [facility]: Teradose 4D    [facility]: [employer]



RADIATION DOSE:  CT Rad equipment meets quality standard of care and radiation dose reduction techniq
ues were employed. CTDIvol: 10.6 mGy. DLP: 406 mGy-cm. mGy.



LIMITATIONS:  None.



FINDINGS:  PELVIC BONES: No acute fracture. No worrisome bone lesions.

VISUALIZED SPINE: Lumbar degenerative disc changes.  Sacralization of L5 on the left.

HIP(S): No acute fracture or dislocation. No worrisome bone lesions.

PELVIC SOFT TISSUES: No significant findings.

EXTRAPELVIC SOFT TISSUES: No significant findings.

OTHER: No other significant finding.



IMPRESSION:  Lumbar findings as described.  No acute abnormality in the pelvis or hips.



TECHNICAL DOCUMENTATION:  JOB ID:  [id number]

Quality ID # 436: Final reports with documentation of one or more dose reduction techniques (e.g., Au
tomated exposure control, adjustment of the mA and/or kV according to patient size, use of iterative 
reconstruction technique)

 2011 [employer]- All Rights Reserved



Reading location - IP/workstation name: [email or web address]

## 2018-10-22 NOTE — ER DOCUMENT REPORT
ED Medical Screen (RME)





- General


Chief Complaint: Hip Pain


Stated Complaint: FALL/HIP PAIN


Time Seen by Provider: 10/22/18 13:51


Notes: 





Patient is a 78-year-old female with recent fall that presents to the emergency 

department for chief complaint of bilateral hip pain, left worse than right.  

Patient is having extreme difficulty ambulating, due to pain in her hips, the 

left is worse than the right, she had a fall on the , was x-rayed at that 

time and was negative, but she has been having difficulty ambulating even a few 

steps at home, so she was brought back to the emergency department by her 

family.  She currently has a Butrans patch on, and has been taking 

hydromorphone at home which has not been helping..





ROS:


Unless otherwise stated in this report the patient's positive and negative 

responses for review of systems for constitutional, eyes, ENT, cardiovascular, 

respiratory, gastrointestinal, neurological, genitourinary, musculoskeletal, 

and integumentary systems and related systems to the presenting problem are 

either as stated in the HPI or were not pertinent or were negative for the 

symptoms and/or complaints related to the presenting medical problem.





PHYSICAL EXAMINATION:





Vital signs reviewed. 





GENERAL: Well-appearing, well-nourished and in no acute distress.





HEAD: Atraumatic, normocephalic.





EYES: Pupils equal round extraocular movements intact,  conjunctiva are normal.





ENT: Nares patent





NECK: Normal range of motion





CV: Heart regular rate and rhythm





LUNGS: No respiratory distress





Musculoskeletal: Pain with range of motion of the hips, patient examined in the 

wheelchair, therefore difficult exam, but with internal and external rotation 

patient did have pain, bilaterally, and tenderness with palpation over the 

greater trochanters bilaterally.





NEUROLOGICAL:  Normal speech





PSYCH: Normal mood, normal affect.





MDM:


Patient seen and examined for rapid initial assessment. Vital signs reviewed.  

A comprehensive ED assessment and evaluation of the patient, analysis of test 

results and completion of the medical decision making process will be conducted 

by additional ED providers.





*Note is created using voice recognition software and may contain spelling, 

syntax or grammatical errors.  











TRAVEL OUTSIDE OF THE [place] IN LAST 30 DAYS: No





- Related Data


Allergies/Adverse Reactions: 


 





doxepin Allergy (Verified 18 17:46)


 











Past Medical History





- Social History


Chew tobacco use (# tins/day): No


Frequency of alcohol use: None


Drug Abuse: None





- Past Medical History


Cardiac Medical History: Reports: Hx Hypercholesterolemia - no meds x 2 years, 

after 60 pound wt loss, Hx Hypertension - no meds x 2 years, after 60 pound wt 

loss


   Denies: Hx Atrial Fibrillation, Hx Congestive Heart Failure, Hx Coronary 

Artery Disease, Hx Heart Attack, Hx Peripheral Vascular Disease, Hx Pulmonary 

Embolism, Hx Heart Murmur


Pulmonary Medical History: Reports: Hx Pneumonia - denies hospitalization, Hx 

Sleep Apnea - CPAP ill fitting, does not wear


   Denies: Hx Asthma, Hx Bronchitis, Hx COPD, Hx Respiratory Failure, Hx 

Tuberculosis


Neurological Medical History: Reports: Hx Cerebrovascular Accident - , 

, Hx Seizures - possibly r/t severe "migraine type" HA's


Endocrine Medical History: Reports: Hx Diabetes Mellitus Type 2, Hx 

Hypothyroidism - meds Rx'ed .  Denies: Hx Graves' Disease, Hx 

Hyperthyroidism


Renal/ Medical History: Denies: Hx End Stage Renal Disease, Hx Kidney Stones, 

Hx Ovarian Cysts, Hx Peritoneal Dialysis, Hx Pelvic Inflammatory Disease


Malignancy Medical History: Denies: Hx Breast Cancer, Hx Cervical Cancer, Hx 

Leukemia, Hx Lung Cancer, Hx Ovarian Cancer


GI Medical History: Reports: Hx Gastroesophageal Reflux Disease - meds x 20+ 

years, Hx Ulcer - .  Denies: Hx Crohn's Disease, Hx Hepatitis, Hx Hiatal 

Hernia, Hx Irritable Bowel, Hx Liver Failure, Hx Pancreatitis


Musculoskeltal Medical History: Reports Hx Arthritis, Denies Hx Fibromyalgia, 

Denies Hx Multiple Sclerosis, Denies Hx Muscular Dystrophy


Psychiatric Medical History: Reports: Hx Dementia, Hx Depression


   Denies: Hx Bipolar Disorder, Hx Post Traumatic Stress Disorder, Hx 

Schizophrenia


Traumatic Medical History: Reports: Hx Fractures - 5th toe RT foot


Infectious Medical History: Denies: Hx Hepatitis, Hx HIV


Past Surgical History: Reports: Hx Appendectomy - incidental with POP, Hx 

Cholecystectomy - open chang , Hx Hysterectomy.  Denies: Hx Bowel Surgery

, Hx  Section, Hx Colostomy, Hx Coronary Artery Bypass Graft, Hx 

Gastric Bypass Surgery, Hx Herniorrhaphy, Hx Mastectomy, Hx Open Heart Surgery, 

Hx Pacemaker, Hx Tonsillectomy, Hx Tubal Ligation





- Immunizations


Immunizations up to date: Yes


Hx Diphtheria, Pertussis, Tetanus Vaccination: Yes





Doctor's Discharge





- Discharge


Referrals: 


STACY MCGILL MD [Primary Care Provider] - Follow up as needed